# Patient Record
Sex: MALE | URBAN - METROPOLITAN AREA
[De-identification: names, ages, dates, MRNs, and addresses within clinical notes are randomized per-mention and may not be internally consistent; named-entity substitution may affect disease eponyms.]

---

## 2020-05-18 ENCOUNTER — TELEMEDICINE2 (OUTPATIENT)
Dept: CARDIOLOGY | Facility: MEDICAL CENTER | Age: 25
End: 2020-05-18
Payer: COMMERCIAL

## 2020-05-18 VITALS
HEIGHT: 70 IN | TEMPERATURE: 98.4 F | BODY MASS INDEX: 22.9 KG/M2 | OXYGEN SATURATION: 100 % | WEIGHT: 160 LBS | HEART RATE: 86 BPM | RESPIRATION RATE: 16 BRPM | DIASTOLIC BLOOD PRESSURE: 87 MMHG | SYSTOLIC BLOOD PRESSURE: 129 MMHG

## 2020-05-18 DIAGNOSIS — R06.02 SHORTNESS OF BREATH: ICD-10-CM

## 2020-05-18 DIAGNOSIS — Z72.0 TOBACCO ABUSE: ICD-10-CM

## 2020-05-18 PROBLEM — I07.1 TRICUSPID REGURGITATION: Status: ACTIVE | Noted: 2020-05-18

## 2020-05-18 PROBLEM — I07.1 TRICUSPID REGURGITATION: Status: RESOLVED | Noted: 2020-05-18 | Resolved: 2020-05-18

## 2020-05-18 PROBLEM — I34.0 MITRAL REGURGITATION: Status: RESOLVED | Noted: 2020-05-18 | Resolved: 2020-05-18

## 2020-05-18 PROBLEM — I34.0 MITRAL REGURGITATION: Status: ACTIVE | Noted: 2020-05-18

## 2020-05-18 PROCEDURE — 99406 BEHAV CHNG SMOKING 3-10 MIN: CPT | Mod: GT | Performed by: INTERNAL MEDICINE

## 2020-05-18 PROCEDURE — 99202 OFFICE O/P NEW SF 15 MIN: CPT | Mod: 25,95,CR,GT | Performed by: INTERNAL MEDICINE

## 2020-05-18 ASSESSMENT — ENCOUNTER SYMPTOMS
VOMITING: 0
NEUROLOGICAL NEGATIVE: 1
ABDOMINAL PAIN: 0
CLAUDICATION: 0
CARDIOVASCULAR NEGATIVE: 1
DIZZINESS: 0
COUGH: 0
DOUBLE VISION: 0
BLURRED VISION: 0
FOCAL WEAKNESS: 0
CONSTITUTIONAL NEGATIVE: 1
HEADACHES: 0
MYALGIAS: 0
FEVER: 0
EYES NEGATIVE: 1
WEAKNESS: 0
GASTROINTESTINAL NEGATIVE: 1
CHILLS: 0
NERVOUS/ANXIOUS: 0
NAUSEA: 0
BRUISES/BLEEDS EASILY: 0
DEPRESSION: 0
MUSCULOSKELETAL NEGATIVE: 1
PALPITATIONS: 0
SHORTNESS OF BREATH: 1
PSYCHIATRIC NEGATIVE: 1
WEIGHT LOSS: 0

## 2020-05-18 NOTE — PROGRESS NOTES
Chief Complaint   Patient presents with   • Heart Murmur       Subjective:   Henrik Gibson is a 24 y.o. male who presents today for tele-medicine consultation as requested by Cici Pagan.    Thank you for allowing me to evaluate Mr. Gibson, who as you know is a 24 year old male with shortness of breath which he has experienced for years. He was well until 3 months ago when he began to experience exacerbation of his symptoms. He denies associated chest pain, palpitations, nausea/vomiting or diaphoresis.    Past Medical History:   Diagnosis Date   • Valvular heart disease      History reviewed. No pertinent surgical history.  History reviewed. No pertinent family history.  Social History     Socioeconomic History   • Marital status: Not on file     Spouse name: Not on file   • Number of children: Not on file   • Years of education: Not on file   • Highest education level: Not on file   Occupational History   • Not on file   Social Needs   • Financial resource strain: Not on file   • Food insecurity     Worry: Not on file     Inability: Not on file   • Transportation needs     Medical: Not on file     Non-medical: Not on file   Tobacco Use   • Smoking status: Current Every Day Smoker   • Smokeless tobacco: Never Used   Substance and Sexual Activity   • Alcohol use: Not on file   • Drug use: Not on file   • Sexual activity: Not on file   Lifestyle   • Physical activity     Days per week: Not on file     Minutes per session: Not on file   • Stress: Not on file   Relationships   • Social connections     Talks on phone: Not on file     Gets together: Not on file     Attends [employer]odoxy service: Not on file     Active member of club or organization: Not on file     Attends meetings of clubs or organizations: Not on file     Relationship status: Not on file   • Intimate partner violence     Fear of current or ex partner: Not on file     Emotionally abused: Not on file     Physically abused: Not on file     Forced sexual  "activity: Not on file   Other Topics Concern   • Not on file   Social History Narrative   • Not on file     No Known Allergies     (Medications reviewed.)  No outpatient encounter medications on file as of 5/18/2020.     No facility-administered encounter medications on file as of 5/18/2020.      Review of Systems   Constitutional: Negative.  Negative for chills, fever, malaise/fatigue and weight loss.   HENT: Negative.  Negative for hearing loss.    Eyes: Negative.  Negative for blurred vision and double vision.   Respiratory: Positive for shortness of breath. Negative for cough.    Cardiovascular: Negative.  Negative for chest pain, palpitations, claudication and leg swelling.   Gastrointestinal: Negative.  Negative for abdominal pain, nausea and vomiting.   Genitourinary: Negative.  Negative for dysuria and urgency.   Musculoskeletal: Negative.  Negative for joint pain and myalgias.   Skin: Negative.  Negative for itching and rash.   Neurological: Negative.  Negative for dizziness, focal weakness, weakness and headaches.   Endo/Heme/Allergies: Negative.  Does not bruise/bleed easily.   Psychiatric/Behavioral: Negative.  Negative for depression. The patient is not nervous/anxious.         Objective:   /87 (BP Location: Left arm, Patient Position: Sitting, BP Cuff Size: Adult)   Pulse 86   Temp 36.9 °C (98.4 °F)   Resp 16   Ht 1.778 m (5' 10\")   Wt 72.6 kg (160 lb)   SpO2 100%   BMI 22.96 kg/m²     Physical Exam   Constitutional: He is oriented to person, place, and time. He appears well-developed and well-nourished.   HENT:   Head: Normocephalic and atraumatic.   Eyes: EOM are normal.   Neck: No JVD present.   Cardiovascular: Normal rate, regular rhythm and normal heart sounds.   Lymphadenopathy:     He has no cervical adenopathy.   Neurological: He is alert and oriented to person, place, and time.   Psychiatric: He has a normal mood and affect.     CARDIAC STUDIES/PROCEDURES:    ECHOCARDIOGRAM " CONCLUSIONS at [facility] in [place], [place] (04/20/17)  Echocardiogram showing ejection fraction of 60-65%, mild pulmonary regurgitation.  (study result reviewed)    Laboratory results of (11/12/18) were reviewed. Cholesterol profile of 147/54/56/78 noted.    Assessment:     1. Shortness of breath     2. Tobacco abuse         Medical Decision Making:  Today's Assessment / Status / Plan:     1. Shortness of breath: He is experiencing shortness of breath which is out of proportion to his cardiac issues. He will work on sustaining smoking cessation and increasing exercise.  2. Chronic tobacco use: Smoking cessation recommended with discussions of health effects and plans for over 3 minutes.    We will see the patient as needed.    Thank you for this consult.